# Patient Record
Sex: MALE | Race: OTHER | URBAN - METROPOLITAN AREA
[De-identification: names, ages, dates, MRNs, and addresses within clinical notes are randomized per-mention and may not be internally consistent; named-entity substitution may affect disease eponyms.]

---

## 2023-07-27 ENCOUNTER — EMERGENCY (EMERGENCY)
Facility: HOSPITAL | Age: 33
LOS: 0 days | Discharge: HOME | End: 2023-07-27
Attending: STUDENT IN AN ORGANIZED HEALTH CARE EDUCATION/TRAINING PROGRAM
Payer: MEDICAID

## 2023-07-27 VITALS
OXYGEN SATURATION: 99 % | TEMPERATURE: 98 F | WEIGHT: 164.91 LBS | HEIGHT: 66 IN | DIASTOLIC BLOOD PRESSURE: 74 MMHG | RESPIRATION RATE: 18 BRPM | HEART RATE: 98 BPM | SYSTOLIC BLOOD PRESSURE: 120 MMHG

## 2023-07-27 DIAGNOSIS — F32.A DEPRESSION, UNSPECIFIED: ICD-10-CM

## 2023-07-27 DIAGNOSIS — F41.9 ANXIETY DISORDER, UNSPECIFIED: ICD-10-CM

## 2023-07-27 PROCEDURE — 99283 EMERGENCY DEPT VISIT LOW MDM: CPT

## 2023-07-27 PROCEDURE — 99284 EMERGENCY DEPT VISIT MOD MDM: CPT

## 2023-07-27 RX ORDER — ALPRAZOLAM 0.25 MG
1 TABLET ORAL
Qty: 6 | Refills: 0
Start: 2023-07-27 | End: 2023-07-29

## 2023-07-27 RX ORDER — ALPRAZOLAM 0.25 MG
2 TABLET ORAL ONCE
Refills: 0 | Status: DISCONTINUED | OUTPATIENT
Start: 2023-07-27 | End: 2023-07-27

## 2023-07-27 RX ADMIN — Medication 2 MILLIGRAM(S): at 15:35

## 2023-07-27 NOTE — ED PROVIDER NOTE - CLINICAL SUMMARY MEDICAL DECISION MAKING FREE TEXT BOX
Attending Raf: 34 y/o M w/ PMH of anxiety, depression, SVT s/p ablation presenting w/ anxiety. Agree w/ above documented HPI/ROS/PE. Pt recently moved to [place] from the [place]. Is waiting for Psychiatrist appointment on 8/22 but has since run out of his xanax. Has not had a dose for 2 days. Today feeling increased stress and anxiety. No SI/HI/AH/VH. Always requesting information for additional psychiatric resources to try and get an earlier appointment. Denies additional complaints at this time. Pt overall well appearing, no acute distress. Lungs clear. HR regular. Abd nondistended/soft/nontender. Non focal neuro exam. Calm and cooperative. ISTOP reviewed, pt prescribed xanax, previously on klonopin. No concern for acute decompensated psychiatric state at this time. Will give dose in ED and send 3 day supply to pharmacy. Will provide w/ psych and internal medicine f/u info. Pt agreeable w/ plan. Return precautions provided.

## 2023-07-27 NOTE — ED PROVIDER NOTE - NSFOLLOWUPINSTRUCTIONS_ED_ALL_ED_FT
Our Emergency Department Referral Coordinators will be reaching out to you in the next 24-48 hours from 9:00am to 5:00pm with a follow up appointment. Please expect a phone call from the hospital in that time frame. If you do not receive a call or if you have any questions or concerns, you can reach them at   [phone]    Generalized Anxiety Disorder, Adult  Generalized anxiety disorder (JOANNA) is a mental health condition. Unlike normal worries, anxiety related to JOANNA is not triggered by a specific event. These worries do not fade or get better with time. JOANNA interferes with relationships, work, and school.    JOANNA symptoms can vary from mild to severe. People with severe JOANNA can have intense waves of anxiety with physical symptoms that are similar to panic attacks.    What are the causes?  The exact cause of JOANNA is not known, but the following are believed to have an impact:  Differences in natural brain chemicals.  Genes passed down from parents to children.  Differences in the way threats are perceived.  Development and stress during childhood.  Personality.  What increases the risk?  The following factors may make you more likely to develop this condition:  Being female.  Having a family history of anxiety disorders.  Being very shy.  Experiencing very stressful life events, such as the death of a loved one.  Having a very stressful family environment.  What are the signs or symptoms?  People with JOANNA often worry excessively about many things in their lives, such as their health and family. Symptoms may also include:  Mental and emotional symptoms:  Worrying excessively about natural disasters.  Fear of being late.  Difficulty concentrating.  Fears that others are judging your performance.  Physical symptoms:  Fatigue.  Headaches, muscle tension, muscle twitches, trembling, or feeling shaky.  Feeling like your heart is pounding or beating very fast.  Feeling out of breath or like you cannot take a deep breath.  Having trouble falling asleep or staying asleep, or experiencing restlessness.  Sweating.  Nausea, diarrhea, or irritable bowel syndrome (IBS).  Behavioral symptoms:  Experiencing erratic moods or irritability.  Avoidance of new situations.  Avoidance of people.  Extreme difficulty making decisions.  How is this diagnosed?  This condition is diagnosed based on your symptoms and medical history. You will also have a physical exam. Your health care provider may perform tests to rule out other possible causes of your symptoms.    To be diagnosed with JOANNA, a person must have anxiety that:  Is out of his or her control.  Affects several different aspects of his or her life, such as work and relationships.  Causes distress that makes him or her unable to take part in normal activities.  Includes at least three symptoms of JOANNA, such as restlessness, fatigue, trouble concentrating, irritability, muscle tension, or sleep problems.  Before your health care provider can confirm a diagnosis of JOANNA, these symptoms must be present more days than they are not, and they must last for 6 months or longer.    How is this treated?  A person talking with a mental health specialist.   This condition may be treated with:  Medicine. Antidepressant medicine is usually prescribed for long-term daily control. Anti-anxiety medicines may be added in severe cases, especially when panic attacks occur.  Talk therapy (psychotherapy). Certain types of talk therapy can be helpful in treating JOANNA by providing support, education, and guidance. Options include:  Cognitive behavioral therapy (CBT). People learn coping skills and self-calming techniques to ease their physical symptoms. They learn to identify unrealistic thoughts and behaviors and to replace them with more appropriate thoughts and behaviors.  Acceptance and commitment therapy (ACT). This treatment teaches people how to be mindful as a way to cope with unwanted thoughts and feelings.  Biofeedback. This process trains you to manage your body's response (physiological response) through breathing techniques and relaxation methods. You will work with a therapist while machines are used to monitor your physical symptoms.  Stress management techniques. These include yoga, meditation, and exercise.  A mental health specialist can help determine which treatment is best for you. Some people see improvement with one type of therapy. However, other people require a combination of therapies.    Follow these instructions at home:  Lifestyle    Maintain a consistent routine and schedule.  Anticipate stressful situations. Create a plan and allow extra time to work with your plan.  Practice stress management or self-calming techniques that you have learned from your therapist or your health care provider.  Exercise regularly and spend time outdoors.  Eat a healthy diet that includes plenty of vegetables, fruits, whole grains, low-fat dairy products, and lean protein.  Do not eat a lot of foods that are high in fat, added sugar, or salt (sodium).  Drink plenty of water.  Avoid alcohol. Alcohol can increase anxiety.  Avoid caffeine and certain over-the-counter cold medicines. These may make you feel worse. Ask your pharmacist which medicines to avoid.  General instructions    Take over-the-counter and prescription medicines only as told by your health care provider.  Understand that you are likely to have setbacks. Accept this and be kind to yourself as you persist to take better care of yourself.  Anticipate stressful situations. Create a plan and allow extra time to work with your plan.  Recognize and accept your accomplishments, even if you  them as small.  Spend time with people who care about you.  Keep all follow-up visits. This is important.  Where to find more information  National [place] of Mental Health: www.nimh.nih.gov    Substance Abuse and Mental Health Services: www.samhsa.gov    Contact a health care provider if:  Your symptoms do not get better.  Your symptoms get worse.  You have signs of depression, such as:  A persistently sad or irritable mood.  Loss of enjoyment in activities that used to bring you peter.  Change in weight or eating.  Changes in sleeping habits.  Get help right away if:  You have thoughts about hurting yourself or others.  If you ever feel like you may hurt yourself or others, or have thoughts about taking your own life, get help right away. Go to your nearest emergency department or:  Call your local emergency services (911 in the [place]  Call a suicide crisis helpline, such as the National Suicide Prevention Lifeline at 1-213.618.1197 or 008 in the [place] This is open 24 hours a day in the [place]  Text the Crisis Text Line at 347305 (in the [place]  Summary  Generalized anxiety disorder (JOANNA) is a mental health condition that involves worry that is not triggered by a specific event.  People with JOANNA often worry excessively about many things in their lives, such as their health and family.  JOANNA may cause symptoms such as restlessness, trouble concentrating, sleep problems, frequent sweating, nausea, diarrhea, headaches, and trembling or muscle twitching.  A mental health specialist can help determine which treatment is best for you. Some people see improvement with one type of therapy. However, other people require a combination of therapies.  This information is not intended to replace advice given to you by your health care provider. Make sure you discuss any questions you have with your health care provider.

## 2023-07-27 NOTE — ED ADULT NURSE NOTE - OBJECTIVE STATEMENT
Pt recently discharged from inpatient facility and ran out of medication. Pt states he usually takes xanax 2mg and cannot get an appointment outpatient until august. Pt states he feels anxious, denies SI/HI.

## 2023-07-27 NOTE — ED PROVIDER NOTE - PATIENT PORTAL LINK FT
You can access the FollowMyHealth Patient Portal offered by [facility] by registering at the following website: [email or web address]. By joining [employer]’s FollowMyHealth portal, you will also be able to view your health information using other applications (apps) compatible with our system.

## 2023-07-27 NOTE — ED ADULT TRIAGE NOTE - CHIEF COMPLAINT QUOTE
Pt usually take xanax 2mg twice a day and ran out yesterday morning. C/o feeling "jittery" Denies si/hi

## 2023-07-27 NOTE — ED PROVIDER NOTE - OBJECTIVE STATEMENT
33-year-old male with past medical history of anxiety and depression who presents for refill for his Xanax prescription.  States that he ran out 2 days ago.  Patient typically takes 2 mg of Xanax 2 times a day.  States he recently moved from the [place], is seeking to establish care with psychiatry here.  Has a scheduled appointment for August 22.  Denies SI/HI.  No chest pain, palpitations.  Endorses jitteriness. No substance use, alcohol use, tobacco use.

## 2023-07-27 NOTE — ED PROVIDER NOTE - ATTENDING CONTRIBUTION TO CARE
Attending Raf: I performed a history and physical exam of the patient and discussed their management with the resident/fellow/student. I have reviewed the resident/fellow/student note and agree with the documented findings and plan of care, except as noted. I have personally performed a substantive portion of the visit including all aspects of the medical decision making. My medical decision making and observations are found above. Please refer to any progress notes for updates on clinical course. My notes supersedes the above resident/fellow/student note in case of discrepancy

## 2023-07-27 NOTE — ED ADULT NURSE NOTE - NSFALLUNIVINTERV_ED_ALL_ED
Monitor for mental status changes and reorient to person, place, and time, as needed/Reinforce activity limits and safety measures with patient and family/Use of alarms - bed, stretcher, chair and/or video monitoring/Bed/Stretcher in lowest position, wheels locked, appropriate side rails in place/Call bell, personal items and telephone in reach/Instruct patient to call for assistance before getting out of bed/chair/stretcher/Non-slip footwear applied when patient is off stretcher/[place] to call system/Physically safe environment - no spills, clutter or unnecessary equipment/Purposeful proactive rounding/Room/bathroom lighting operational, light cord in reach

## 2023-08-02 ENCOUNTER — EMERGENCY (EMERGENCY)
Facility: HOSPITAL | Age: 33
LOS: 0 days | Discharge: HOME | End: 2023-08-02
Attending: EMERGENCY MEDICINE
Payer: MEDICAID

## 2023-08-02 VITALS
OXYGEN SATURATION: 100 % | RESPIRATION RATE: 18 BRPM | SYSTOLIC BLOOD PRESSURE: 125 MMHG | HEIGHT: 66 IN | HEART RATE: 95 BPM | TEMPERATURE: 98 F | DIASTOLIC BLOOD PRESSURE: 71 MMHG

## 2023-08-02 DIAGNOSIS — Z76.0 ENCOUNTER FOR ISSUE OF REPEAT PRESCRIPTION: ICD-10-CM

## 2023-08-02 DIAGNOSIS — F43.10 POST-TRAUMATIC STRESS DISORDER, UNSPECIFIED: ICD-10-CM

## 2023-08-02 DIAGNOSIS — F41.9 ANXIETY DISORDER, UNSPECIFIED: ICD-10-CM

## 2023-08-02 PROCEDURE — 99283 EMERGENCY DEPT VISIT LOW MDM: CPT

## 2023-08-02 PROCEDURE — 99284 EMERGENCY DEPT VISIT MOD MDM: CPT

## 2023-08-02 RX ORDER — ALPRAZOLAM 0.25 MG
2 TABLET ORAL ONCE
Refills: 0 | Status: DISCONTINUED | OUTPATIENT
Start: 2023-08-02 | End: 2023-08-02

## 2023-08-02 RX ADMIN — Medication 2 MILLIGRAM(S): at 10:50

## 2023-08-02 NOTE — CHART NOTE - NSCHARTNOTEFT_GEN_A_CORE
Pt scheduled for 8/3 @ 1pm @ [facility], Pt has been contacted and is aware of appt.- AC

## 2023-08-02 NOTE — ED PROVIDER NOTE - NSFOLLOWUPINSTRUCTIONS_ED_ALL_ED_FT
Our Emergency Department Referral Coordinators will be reaching out to you in the next 24-48 hours from 9:00am to 5:00pm with a follow up appointment. Please expect a phone call from the hospital in that time frame. If you do not receive a call or if you have any questions or concerns, you can reach them at   [phone]

## 2023-08-02 NOTE — ED PROVIDER NOTE - OBJECTIVE STATEMENT
Patient here requesting refill of Xanax prescription.  Patient states he has anxiety PTSD and takes Xanax 2 mg twice a day.  He states he received on July 27 and received 3-day supply which she filled and last took his last pill yesterday.  He has no shaking seizure-like activity and states he needs a refill of his medication.  He recently moved here and is unable to follow-up with his previous doctor as they were part of a program that he was and that is no longer part of.  I reviewed the [facility]  I-STOP prescription monitoring program and on July 12 patient received 30 pills of 2 mg of Xanax which supposed last 30 days however patient states he takes it twice a day and therefore was not long of supply.  He additionally received Xanax 2 mg for 3-day supply on July 27.

## 2023-08-02 NOTE — ED PROVIDER NOTE - PATIENT PORTAL LINK FT
You can access the FollowMyHealth Patient Portal offered by [facility] by registering at the following website: [email or web address]. By joining [employer]’s FollowMyHealth portal, you will also be able to view your health information using other applications (apps) compatible with our system.

## 2023-08-02 NOTE — ED PROVIDER NOTE - PHYSICAL EXAMINATION
CON: ao x 3, HENMT: neck supple,  CV: s1s2 ctab, RESP: cta b/l, GI:  soft, nontender, no rebound, no guarding, SKIN: no rash, MSK: no deformities, NEURO: no gross motor or sensory deficit  no tremor or fasiculations Psychiatric: appropriate mood, appropriate affect

## 2023-08-03 ENCOUNTER — APPOINTMENT (OUTPATIENT)
Dept: INTERNAL MEDICINE | Facility: CLINIC | Age: 33
End: 2023-08-03
Payer: MEDICAID

## 2023-08-03 ENCOUNTER — OUTPATIENT (OUTPATIENT)
Dept: OUTPATIENT SERVICES | Facility: HOSPITAL | Age: 33
LOS: 1 days | End: 2023-08-03
Payer: MEDICAID

## 2023-08-03 VITALS
DIASTOLIC BLOOD PRESSURE: 81 MMHG | HEART RATE: 53 BPM | OXYGEN SATURATION: 98 % | TEMPERATURE: 98.3 F | BODY MASS INDEX: 25.48 KG/M2 | SYSTOLIC BLOOD PRESSURE: 132 MMHG | WEIGHT: 172 LBS | HEIGHT: 69 IN

## 2023-08-03 DIAGNOSIS — Z87.09 PERSONAL HISTORY OF OTHER DISEASES OF THE RESPIRATORY SYSTEM: ICD-10-CM

## 2023-08-03 DIAGNOSIS — I47.1 SUPRAVENTRICULAR TACHYCARDIA: ICD-10-CM

## 2023-08-03 DIAGNOSIS — F43.10 POST-TRAUMATIC STRESS DISORDER, UNSPECIFIED: ICD-10-CM

## 2023-08-03 DIAGNOSIS — F41.9 ANXIETY DISORDER, UNSPECIFIED: ICD-10-CM

## 2023-08-03 DIAGNOSIS — F32.A ANXIETY DISORDER, UNSPECIFIED: ICD-10-CM

## 2023-08-03 DIAGNOSIS — F17.200 NICOTINE DEPENDENCE, UNSPECIFIED, UNCOMPLICATED: ICD-10-CM

## 2023-08-03 DIAGNOSIS — Z00.00 ENCOUNTER FOR GENERAL ADULT MEDICAL EXAMINATION WITHOUT ABNORMAL FINDINGS: ICD-10-CM

## 2023-08-03 PROCEDURE — 99203 OFFICE O/P NEW LOW 30 MIN: CPT | Mod: GC

## 2023-08-03 PROCEDURE — 99203 OFFICE O/P NEW LOW 30 MIN: CPT

## 2023-08-03 RX ORDER — ALPRAZOLAM 2 MG/1
2 TABLET ORAL
Refills: 0 | Status: ACTIVE | COMMUNITY

## 2023-08-03 RX ORDER — FLUTICASONE PROPIONATE AND SALMETEROL 50; 100 UG/1; UG/1
100-50 POWDER RESPIRATORY (INHALATION)
Qty: 2 | Refills: 2 | Status: ACTIVE | COMMUNITY

## 2023-08-03 RX ORDER — ALBUTEROL 90 MCG
AEROSOL (GRAM) INHALATION
Refills: 0 | Status: ACTIVE | COMMUNITY

## 2023-08-03 RX ORDER — GABAPENTIN 600 MG/1
600 TABLET, COATED ORAL
Refills: 0 | Status: ACTIVE | COMMUNITY

## 2023-08-03 RX ORDER — OMEPRAZOLE 20 MG/1
20 CAPSULE, DELAYED RELEASE ORAL
Refills: 0 | Status: ACTIVE | COMMUNITY

## 2023-08-03 RX ORDER — MIRTAZAPINE 15 MG/1
15 TABLET, FILM COATED ORAL
Refills: 0 | Status: ACTIVE | COMMUNITY

## 2023-08-03 NOTE — REVIEW OF SYSTEMS
[Negative] : Cardiovascular [Chest Pain] : no chest pain [Palpitations] : no palpitations [Lower Ext Edema] : no lower extremity edema [Orthopena] : no orthopnea [Paroxysmal Nocturnal Dyspnea] : no paroxysmal nocturnal dyspnea [Joint Stiffness] : no joint stiffness [Joint Swelling] : no joint swelling [Suicidal] : not suicidal [Insomnia] : no insomnia [Anxiety] : no anxiety [Depression] : no depression

## 2023-08-03 NOTE — ASSESSMENT
[FreeTextEntry1] : 33 years old male with PMH of anxiety, PTSD, depression, asthma, SVT s/p ablation twice presenting to the medicine clinic to establish care and for refill of medications. pt is active smoker. denies any changes in weight, appetite, alcohol intake, drug use or suicidal ideation.   #Asthma (controlled) -c/w albuterol  # SVT -s/p ablation -f/u with cardiologist   #hx of depression/ptsd/anxiety -f/u with psychiatrist -PHQ score is zero  #smoker  #HCM -f/u with cardio -f/u with psychiatrist -blood work ordered today -f/u in 3 months or PRN

## 2023-08-03 NOTE — PHYSICAL EXAM
[No Acute Distress] : no acute distress [Well Nourished] : well nourished [Well Developed] : well developed [No Respiratory Distress] : no respiratory distress  [Clear to Auscultation] : lungs were clear to auscultation bilaterally [Normal Rate] : normal rate  [Regular Rhythm] : with a regular rhythm [Normal S1, S2] : normal S1 and S2 [No Edema] : there was no peripheral edema [No Joint Swelling] : no joint swelling

## 2023-08-03 NOTE — HISTORY OF PRESENT ILLNESS
[FreeTextEntry1] : 33 year old male presenting to the medicine clinic to establish care [de-identified] : 33 years old male with PMH of anxiety, PTSD, depression, asthma, SVT s/p ablation twice presenting to the medicine clinic to establish care and  for refill of medications. pt is active smoker. denies any changes in weight, appetite, alcohol intake, drug use or suicidal ideation.

## 2023-08-04 ENCOUNTER — EMERGENCY (EMERGENCY)
Facility: HOSPITAL | Age: 33
LOS: 0 days | Discharge: HOME | End: 2023-08-04
Attending: EMERGENCY MEDICINE
Payer: MEDICAID

## 2023-08-04 VITALS
RESPIRATION RATE: 18 BRPM | HEART RATE: 122 BPM | TEMPERATURE: 98 F | SYSTOLIC BLOOD PRESSURE: 155 MMHG | WEIGHT: 171.96 LBS | HEIGHT: 66 IN | DIASTOLIC BLOOD PRESSURE: 81 MMHG | OXYGEN SATURATION: 99 %

## 2023-08-04 VITALS — DIASTOLIC BLOOD PRESSURE: 80 MMHG | SYSTOLIC BLOOD PRESSURE: 145 MMHG | HEART RATE: 99 BPM

## 2023-08-04 DIAGNOSIS — F43.10 POST-TRAUMATIC STRESS DISORDER, UNSPECIFIED: ICD-10-CM

## 2023-08-04 DIAGNOSIS — Z87.09 PERSONAL HISTORY OF OTHER DISEASES OF THE RESPIRATORY SYSTEM: ICD-10-CM

## 2023-08-04 DIAGNOSIS — F41.9 ANXIETY DISORDER, UNSPECIFIED: ICD-10-CM

## 2023-08-04 DIAGNOSIS — F17.200 NICOTINE DEPENDENCE, UNSPECIFIED, UNCOMPLICATED: ICD-10-CM

## 2023-08-04 DIAGNOSIS — Z76.0 ENCOUNTER FOR ISSUE OF REPEAT PRESCRIPTION: ICD-10-CM

## 2023-08-04 PROCEDURE — 99281 EMR DPT VST MAYX REQ PHY/QHP: CPT

## 2023-08-04 PROCEDURE — 99283 EMERGENCY DEPT VISIT LOW MDM: CPT

## 2023-08-04 NOTE — ED PROVIDER NOTE - PATIENT PORTAL LINK FT
You can access the FollowMyHealth Patient Portal offered by [facility] by registering at the following website: [email or web address]. By joining [employer]’s FollowMyHealth portal, you will also be able to view your health information using other applications (apps) compatible with our system.

## 2023-08-04 NOTE — ED PROVIDER NOTE - OBJECTIVE STATEMENT
33-year-old male, with past medical history of anxiety and PTSD, presents to the emergency department requesting Xanax refill.  He takes Xanax 2 mg twice daily.  Patient was last given 1 dose of Xanax in the ED on 8/2.  Prior to that he was given 6 pills on 7/27 and 30 pills on 7/12.  Patient recently moved to [place] from the [place] and was recently incarcerated, since being released he has had difficulty seeing a psychiatrist to prescribe his medications.

## 2023-08-04 NOTE — ED PROVIDER NOTE - CLINICAL SUMMARY MEDICAL DECISION MAKING FREE TEXT BOX
Pt presented requesting medication refill. Discussed ED policy. Discharged with information for OPCD.

## 2023-08-04 NOTE — ED PROVIDER NOTE - ATTENDING CONTRIBUTION TO CARE
34 yo male presented 32 yo male presented to the ED requesting refill of his Xanax, reporting that he went to [address] [place] [address] [facility] and they would not prescribe. Pt explains he is currently in a program and working with a peer.   Explained policy on controlled Rx prescribing from the ED. Pt given contact information for our OPCD program if he chooses to change providers. [facility]

## 2023-08-04 NOTE — ED PROVIDER NOTE - PROGRESS NOTE DETAILS
AB: Patient requesting detox from [facility]. Will provide outpatient detox clinic information upon discharge.

## 2023-08-04 NOTE — ED ADULT TRIAGE NOTE - CHIEF COMPLAINT QUOTE
Patient sent from [address] [place] [address] for xanax renewal, patient was seen here on wed and given a 3 day supply, patient states [address] did not help him, patient's last dose of xanax was in ED on wed

## 2023-08-04 NOTE — ED PROVIDER NOTE - NSFOLLOWUPINSTRUCTIONS_ED_ALL_ED_FT
Xanax is a benzodiazepines that are used as anxiolytics, muscle relaxants, and as treatment for withdrawal of sedative hypnotics.  Overdose usually manifests with CNS depression.  Respiratory depression is not commonly seen unless from parenteral administration or combination with other sedative hypnotics.  Flumazenil can be given to reverse the effects of benzodiazepine overdose but not recommended for patient who take a benzodiazepine chronically given risk of withdrawal seizure. Please go to our outpatient detox program located at [address] [place] [address]. Walk in hours from [phone]. You can also call [phone] for an appointment.     Xanax is a benzodiazepines that are used as anxiolytics, muscle relaxants, and as treatment for withdrawal of sedative hypnotics.  Overdose usually manifests with CNS depression.  Respiratory depression is not commonly seen unless from parenteral administration or combination with other sedative hypnotics.  Flumazenil can be given to reverse the effects of benzodiazepine overdose but not recommended for patient who take a benzodiazepine chronically given risk of withdrawal seizure.

## 2023-08-19 ENCOUNTER — EMERGENCY (EMERGENCY)
Facility: HOSPITAL | Age: 33
LOS: 0 days | Discharge: LEFT BEFORE DISCHARGE | End: 2023-08-19
Attending: EMERGENCY MEDICINE
Payer: MEDICAID

## 2023-08-19 VITALS
OXYGEN SATURATION: 100 % | WEIGHT: 171.96 LBS | RESPIRATION RATE: 18 BRPM | TEMPERATURE: 98 F | HEIGHT: 66 IN | SYSTOLIC BLOOD PRESSURE: 134 MMHG | DIASTOLIC BLOOD PRESSURE: 81 MMHG | HEART RATE: 103 BPM

## 2023-08-19 DIAGNOSIS — Z87.898 PERSONAL HISTORY OF OTHER SPECIFIED CONDITIONS: ICD-10-CM

## 2023-08-19 DIAGNOSIS — Z86.59 PERSONAL HISTORY OF OTHER MENTAL AND BEHAVIORAL DISORDERS: ICD-10-CM

## 2023-08-19 DIAGNOSIS — Z76.0 ENCOUNTER FOR ISSUE OF REPEAT PRESCRIPTION: ICD-10-CM

## 2023-08-19 DIAGNOSIS — F13.20 SEDATIVE, HYPNOTIC OR ANXIOLYTIC DEPENDENCE, UNCOMPLICATED: ICD-10-CM

## 2023-08-19 DIAGNOSIS — F41.9 ANXIETY DISORDER, UNSPECIFIED: ICD-10-CM

## 2023-08-19 DIAGNOSIS — Z53.29 PROCEDURE AND TREATMENT NOT CARRIED OUT BECAUSE OF PATIENT'S DECISION FOR OTHER REASONS: ICD-10-CM

## 2023-08-19 DIAGNOSIS — Z87.891 PERSONAL HISTORY OF NICOTINE DEPENDENCE: ICD-10-CM

## 2023-08-19 PROBLEM — F43.10 POST-TRAUMATIC STRESS DISORDER, UNSPECIFIED: Chronic | Status: ACTIVE | Noted: 2023-08-05

## 2023-08-19 PROCEDURE — 99283 EMERGENCY DEPT VISIT LOW MDM: CPT

## 2023-08-19 RX ORDER — HYDROXYZINE HCL 10 MG
25 TABLET ORAL ONCE
Refills: 0 | Status: COMPLETED | OUTPATIENT
Start: 2023-08-19 | End: 2023-08-19

## 2023-08-19 RX ADMIN — Medication 25 MILLIGRAM(S): at 13:52

## 2023-08-19 NOTE — ED ADULT NURSE NOTE - NSFALLUNIVINTERV_ED_ALL_ED
Bed/Stretcher in lowest position, wheels locked, appropriate side rails in place/Call bell, personal items and telephone in reach/Instruct patient to call for assistance before getting out of bed/chair/stretcher/Non-slip footwear applied when patient is off stretcher/[place] to call system/Physically safe environment - no spills, clutter or unnecessary equipment/Purposeful proactive rounding/Room/bathroom lighting operational, light cord in reach

## 2023-08-19 NOTE — ED PROVIDER NOTE - OBJECTIVE STATEMENT
34 yo male w/ a PMHx of substance use disorder presents to the ED for med refill. Patient has been on benzodiazapines in the past, has been unable to get access to a new prescription. Patient seen in ED earlier this month, given one time does of ativan. SInce then has been seen in the ED twice, and set up with rapid referral for primary care and psychiatry.

## 2023-08-19 NOTE — ED PROVIDER NOTE - CLINICAL SUMMARY MEDICAL DECISION MAKING FREE TEXT BOX
33-year-old male with substance use disorder presents for medication refill of his chronic benzodiazepine use, third visit this month for same.  Will discharge with outpatient management and return or follow-up instructions.

## 2023-08-19 NOTE — ED PROVIDER NOTE - PHYSICAL EXAMINATION
CONST: Well appearing in NAD  MS: Normal ROM in all extremities.   SKIN: Warm, dry, no acute rashes. Good turgor  NEURO: A&Ox3, No focal deficits. Strength 5/5 with no sensory deficits. Steady gait  Psych: Cooperative mood, appropriate affect.

## 2023-08-19 NOTE — ED PROVIDER NOTE - NSFOLLOWUPINSTRUCTIONS_ED_ALL_ED_FT
Our Emergency Department Referral Coordinators will be reaching out to you in the next 24-48 hours from 9:00am to 5:00pm with a follow up appointment. Please expect a phone call from the hospital in that time frame. If you do not receive a call or if you have any questions or concerns, you can reach them at   [phone]      WHAT YOU NEED TO KNOW:    What is benzodiazepine use disorder (BUD)? BUD is a medical condition that develops from long-term misuse of a benzodiazepine. You are not able to stop even though the misuse causes physical or social problems. BUD is also called benzodiazepine abuse.    What are the signs and symptoms of BUD? Signs and symptoms include at least 2 of the following in a 12-month period:    You take the benzodiazepine in a way it was not intended. This is also called misuse. For example, your prescription is for anxiety relief, but you take it to feel good instead. You take more than prescribed or for longer than recommended. Misuse can also mean you take the benzodiazepine even though you do not have a prescription for it.    You have a strong urge or craving for the benzodiazepine. This is also called addiction. You are not able to control when you take it or how much you take. You spend large amounts of time trying to get, take, or recover from the benzodiazepine. In between doses, you think about when you will get to take it again.    You become dependent on the benzodiazepine. Dependence means your body becomes used to the benzodiazepine. You have withdrawal symptoms when you do not take the medicine for a short amount of time. You have to take it to stop or prevent withdrawal symptoms, such as shaky hands.    You become tolerant to the benzodiazepine. This means the amount you have been taking no longer has the effects you want. You need higher amounts to feel the effects.    You are not able to decrease or stop taking the benzodiazepine. You start again when you try to quit or take a lower amount.    You keep taking the benzodiazepine even though it causes problems or is dangerous. For example, you drive even though the benzodiazepine makes you drowsy. You try to make the effect stronger by drinking alcohol or taking other drugs with it. You have problems at school, work, or home. You spend less time doing important or enjoyable activities.  How is BUD diagnosed and treated? Your healthcare provider will ask when you take benzodiazepines, and how much you take. Blood or urine tests may be used to check the level of benzodiazepines in your system. The tests can also check for physical problems benzodiazepines can cause. Healthcare providers can help you make decisions about treatment programs. Treatment may be offered in a hospital, outpatient facility, or drug rehabilitation center. The goal is to help you decrease or stop taking the benzodiazepine.    A detox program includes medicine and treatment to reduce withdrawal symptoms and anxiety when you stop taking benzodiazepines. You will be in the hospital with close monitoring and care.    Your dose will be gradually decreased by your healthcare provider to help prevent withdrawal symptoms.    Cognitive behavioral therapy (CBT) can help you manage depression and anxiety caused by BUD. CBT can be done with you and a talk therapist or in a group with others.    Motivational enhancement therapy can help you set and reach healthy, positive goals.    Twelve-step facilitation (TSF) is a short, structured approach to reach early recovery. It is done one-to-one with a therapist in 12 to 15 sessions.  What do I need to know about benzodiazepine safety?    Do not mix benzodiazepines with medicines, drugs, or alcohol. The combination can cause an overdose, or cause you to stop breathing.    Learn about the signs of an overdose so you know how to respond. Tell others about these signs so they will know what to do if needed.    Keep benzodiazepines out of the reach of children. Store them in a locked cabinet or in a location that children cannot get to.  Common Childproofing Latches   Where can I find support and more information?    Substance Abuse and Mental Health Services Administration  [address]  [place] [address]  Web Address: http://www.[facility] Centerhsa.gov  [employer] [place] [employer]  [address] [place] [address], [address]  [place], [id number]  Phone: 1-259.639.6552  Web Address: www.[place].nih.gov  Call your local emergency number (911 in the [place]) if:    You have chest pain or breathing problems.    You feel like hurting or killing yourself, or someone else.    You have a seizure.  When should I seek immediate care?    Your speech is slurred.    You are too weak to stand up.    You have eye movements that you cannot control.  When should I call my doctor?    You are more nervous or anxious than before you took benzodiazepines.    You become irritable or unhappy for no reason.    You have questions or concerns about your condition or care.  CARE AGREEMENT:    You have the right to help plan your care. Learn about your health condition and how it may be treated. Discuss treatment options with your healthcare providers to decide what care you want to receive. You always have the right to refuse treatment.

## 2023-08-19 NOTE — ED PROVIDER NOTE - ATTENDING APP SHARED VISIT CONTRIBUTION OF CARE
I personally evaluated the patient. I reviewed the Resident´s or Physician Assistant´s note (as assigned above), and agree with the findings and plan except as documented in my note.    33-year-old male presents to the emergency department for Xanax refill.  States he recently moved to [place], used to be in the [place], recently released on bail, has been unable to get his medications.  Additional chart review reveals patient has had several visits this month for the same and was provided outpatient follow-up at detox and medicine clinics.  Further history by him describes that these visits have been unsuccessful in rewriting him his medicines, and advised that a  told him not to go to a detox clinic for his chronic benzodiazepine use.  Admits to shakiness.    GENERAL: male in no distress.   HEENT: EOMI   CHEST: normal work of breathing noted  CV: pulses intact   EXTR: FROM   NEURO: AAO 3 no focal deficits  SKIN: normal no pallor   PSYCH: normal mood & mentation    Impression: Substance use disorder    Plan: Outpatient management

## 2023-08-19 NOTE — ED PROVIDER NOTE - NS ED ATTENDING STATEMENT MOD
This was a shared visit with the BHARATH. I reviewed and verified the documentation and independently performed the documented:

## 2024-03-28 NOTE — ED PROVIDER NOTE - PHYSICAL EXAMINATION
GENERAL: Well-developed; well-nourished; in no acute distress.   SKIN: warm, dry  HEAD: Normocephalic; atraumatic.  EYES: PERRLA, EOMI, no conjunctival erythema  ENT: No nasal discharge; airway clear.  NECK: Supple; non tender.  CARD: S1, S2 normal; no murmurs, gallops, or rubs. Regular rhythm. Tachycardia  RESP: LCTAB; No wheezes, rales, rhonchi, or stridor.  ABD: soft, nontender, and nondistended  EXT: Normal ROM.  No LE TTP or edema bilaterally.  NEURO: Alert, oriented, grossly unremarkable  PSYCH: Cooperative, appropriate.
oral